# Patient Record
Sex: MALE | URBAN - METROPOLITAN AREA
[De-identification: names, ages, dates, MRNs, and addresses within clinical notes are randomized per-mention and may not be internally consistent; named-entity substitution may affect disease eponyms.]

---

## 2018-12-11 ENCOUNTER — HOSPITAL ENCOUNTER (OUTPATIENT)
Dept: LAB | Age: 38
Discharge: HOME | End: 2018-12-11

## 2018-12-11 PROCEDURE — 88305 TISSUE EXAM BY PATHOLOGIST: CPT

## 2023-06-22 ENCOUNTER — OFFICE VISIT (OUTPATIENT)
Age: 43
End: 2023-06-22

## 2023-06-22 VITALS
HEIGHT: 72 IN | BODY MASS INDEX: 25.73 KG/M2 | OXYGEN SATURATION: 98 % | TEMPERATURE: 97.9 F | DIASTOLIC BLOOD PRESSURE: 70 MMHG | HEART RATE: 64 BPM | RESPIRATION RATE: 16 BRPM | WEIGHT: 190 LBS | SYSTOLIC BLOOD PRESSURE: 130 MMHG

## 2023-06-22 DIAGNOSIS — M79.644 THUMB PAIN, RIGHT: Primary | ICD-10-CM

## 2023-06-22 ASSESSMENT — ENCOUNTER SYMPTOMS
COUGH: 0
NAUSEA: 0
EYE PAIN: 0
ABDOMINAL PAIN: 0
RHINORRHEA: 0
SINUS PRESSURE: 0
EYES NEGATIVE: 1
SHORTNESS OF BREATH: 0

## 2023-06-22 NOTE — PROGRESS NOTES
PROGRESS NOTE    SUBJECTIVE:   Sonu Katz is a 38 y.o. male seen for posterior R thumb pain with extension and decreased ROM. Pt states pain centralized in the proximal joint radiating to the wrist and forearm. Chief Complaint    Wrist Pain         Wrist Pain   The pain is present in the right hand. This is a chronic problem. The current episode started more than 1 month ago. There has been no history of extremity trauma. The problem occurs daily. The problem has been waxing and waning. The pain is at a severity of 6/10. The pain is mild. Associated symptoms include a limited range of motion and stiffness. Pertinent negatives include no fever, inability to bear weight, joint locking or numbness. He has tried NSAIDS for the symptoms. The treatment provided moderate relief. No current outpatient medications on file. No current facility-administered medications for this visit. No Known Allergies    Social History     Tobacco Use    Smoking status: Never    Smokeless tobacco: Never   Substance Use Topics    Alcohol use: Yes    Drug use: Never        Review of Systems   Constitutional:  Negative for fatigue and fever. HENT:  Negative for rhinorrhea and sinus pressure. Eyes: Negative. Negative for pain. Respiratory:  Negative for cough and shortness of breath. Cardiovascular: Negative. Gastrointestinal:  Negative for abdominal pain and nausea. Musculoskeletal:  Positive for stiffness. Neurological:  Negative for dizziness, weakness, light-headedness and numbness. OBJECTIVE:  /70 (Site: Right Upper Arm, Position: Sitting, Cuff Size: Medium Adult)   Pulse 64   Temp 97.9 °F (36.6 °C) (Temporal)   Resp 16   Ht 6' (1.829 m)   Wt 190 lb (86.2 kg)   SpO2 98%   BMI 25.77 kg/m²      No results found for this visit on 06/22/23. Physical Exam  Constitutional:       Appearance: Normal appearance.    HENT:      Right Ear: Tympanic membrane normal.      Left Ear: Tympanic

## 2023-07-10 ENCOUNTER — OFFICE VISIT (OUTPATIENT)
Dept: ORTHOPEDIC SURGERY | Age: 43
End: 2023-07-10

## 2023-07-10 VITALS — BODY MASS INDEX: 25.06 KG/M2 | WEIGHT: 185 LBS | HEIGHT: 72 IN

## 2023-07-10 DIAGNOSIS — M65.4 DE QUERVAIN'S TENOSYNOVITIS, RIGHT: ICD-10-CM

## 2023-07-10 DIAGNOSIS — M79.641 RIGHT HAND PAIN: Primary | ICD-10-CM

## 2023-07-10 RX ORDER — BETAMETHASONE SODIUM PHOSPHATE AND BETAMETHASONE ACETATE 3; 3 MG/ML; MG/ML
6 INJECTION, SUSPENSION INTRA-ARTICULAR; INTRALESIONAL; INTRAMUSCULAR; SOFT TISSUE ONCE
Status: COMPLETED | OUTPATIENT
Start: 2023-07-10 | End: 2023-07-10

## 2023-07-10 RX ADMIN — BETAMETHASONE SODIUM PHOSPHATE AND BETAMETHASONE ACETATE 6 MG: 3; 3 INJECTION, SUSPENSION INTRA-ARTICULAR; INTRALESIONAL; INTRAMUSCULAR; SOFT TISSUE at 10:05

## 2023-07-10 NOTE — PROGRESS NOTES
The patient was prescribed and fitted with a DeQuervain's thumb brace for the right thumb. Patient read and signed documenting they understand and agree to [facility]'s current DME return policy.

## 2023-07-10 NOTE — PROGRESS NOTES
Orthopaedic Hand Surgery Note    Name: Pramod Manzano  YOB: 1980  Gender: male  MRN: [id number]    CC: New patient referred for hand/wrist pain    HPI: Patient is a 38 y.o. male  with a chief complaint of Right radial sided wrist pain. Pain is severe with pinching and gripping activities. He has had this condition in the past, treated with cortisone injection 10 years ago    ROS/Meds/PSH/PMH/FH/SH: I personally reviewed the patients standard intake form. Pertinents are discussed in the HPI    Physical Examination:  Musculoskeletal:   Examination of the Right upper extremity demonstrates normal sensation to light touch in the median, ulnar and radial distribution, cap refill < 5 seconds in all fingers, positive tenderness at the radial styloid with  a positive Finkelstein test. No pain or crepitus at the point of the second and first compartment intersection. No pain at the thumb [facility] joint. Imaging / Electrodiagnostic Tests:     Hand XR: AP, Lateral, Oblique and Thumb CMC joint     Clinical Indication:  1. Right hand pain    2. De Quervain's tenosynovitis, right           Report: AP, lateral, oblique and thumb CMC joint x-ray of the right hand demonstrates no bony abnormalities    Impression: as above     Maria Fernanda Najera MD         Assessment:     ICD-10-CM    1. Right hand pain  M79.641 XR HAND RIGHT (MIN 3 VIEWS)      2. De Quervain's tenosynovitis, right  M65.4 betamethasone acetate-betamethasone sodium phosphate (CELESTONE) injection 6 mg     INJECT TENDON SHEATH/LIGAMENT     [place] [facility] [place]:  We discussed the diagnosis and different treatment options. We again discussed observation, splinting, cortisone injections and surgical release of the first dorsal extensor compartment.   We discussed that de Quervain's tendinitis is a chronic condition regardless of how long the symptoms have been present, this most likely has been progressing for much longer than the

## 2025-01-30 ENCOUNTER — OFFICE VISIT (OUTPATIENT)
Age: 45
End: 2025-01-30

## 2025-01-30 VITALS
RESPIRATION RATE: 19 BRPM | HEIGHT: 72 IN | OXYGEN SATURATION: 98 % | SYSTOLIC BLOOD PRESSURE: 158 MMHG | WEIGHT: 216.4 LBS | TEMPERATURE: 98.5 F | HEART RATE: 103 BPM | DIASTOLIC BLOOD PRESSURE: 78 MMHG | BODY MASS INDEX: 29.31 KG/M2

## 2025-01-30 DIAGNOSIS — J32.9 RHINOSINUSITIS: Primary | ICD-10-CM

## 2025-01-30 DIAGNOSIS — R05.1 ACUTE COUGH: ICD-10-CM

## 2025-01-30 ASSESSMENT — PATIENT HEALTH QUESTIONNAIRE - PHQ9
SUM OF ALL RESPONSES TO PHQ9 QUESTIONS 1 & 2: 0
1. LITTLE INTEREST OR PLEASURE IN DOING THINGS: NOT AT ALL
SUM OF ALL RESPONSES TO PHQ QUESTIONS 1-9: 0
2. FEELING DOWN, DEPRESSED OR HOPELESS: NOT AT ALL
SUM OF ALL RESPONSES TO PHQ QUESTIONS 1-9: 0

## 2025-01-30 ASSESSMENT — ENCOUNTER SYMPTOMS
SINUS PRESSURE: 1
COUGH: 1
EYE PAIN: 0
RHINORRHEA: 1
EYE REDNESS: 0
WHEEZING: 0
DIARRHEA: 0
SORE THROAT: 1
EYE ITCHING: 0
NAUSEA: 0
SINUS PAIN: 1
SHORTNESS OF BREATH: 0
VOMITING: 0
SINUS COMPLAINT: 1

## 2025-01-30 NOTE — PROGRESS NOTES
PROGRESS NOTE    SUBJECTIVE:   Javed Mckenzie is a 44 y.o. male [facility] for frontal sinus pressure, rhinitis, and postnasal drip causing sore throat and nonproductive cough for 9 days. Patient denies some shortness of breath with exertion but no body aches or chills at this time. Patient has history of pneumonia      Chief Complaint    Cough; Shortness of Breath; burning in chest; ear pressure; Fatigue; throat irritation           Sinus Problem  This is a new problem. The current episode started in the past 7 days. The problem has been gradually worsening since onset. There has been no fever. He is experiencing no pain. Associated symptoms include congestion, coughing, ear pain, sinus pressure and a sore throat. Pertinent negatives include no chills, shortness of breath or sneezing.       Current Outpatient Medications   Medication Sig Dispense Refill    amoxicillin-clavulanate (AUGMENTIN) 875-125 MG per tablet Take 1 tablet by mouth 2 times daily for 7 days 14 tablet 0     No current facility-administered medications for this visit.      No Known Allergies    Social History     Tobacco Use    Smoking status: Never    Smokeless tobacco: Never   Vaping Use    Vaping status: Never Used   Substance Use Topics    Alcohol use: Yes    Drug use: Never        Review of Systems   Constitutional:  Negative for chills, fatigue and fever.   HENT:  Positive for congestion, ear pain, postnasal drip, rhinorrhea, sinus pressure, sinus pain and sore throat. Negative for sneezing.    Eyes:  Negative for pain, redness and itching.   Respiratory:  Positive for cough. Negative for shortness of breath and wheezing.    Cardiovascular:  Negative for chest pain.   Gastrointestinal:  Negative for diarrhea, nausea and vomiting.   Musculoskeletal:  Positive for myalgias.   Allergic/Immunologic: Positive for environmental allergies.   Neurological:  Negative for dizziness.

## 2025-03-06 ENCOUNTER — OFFICE VISIT (OUTPATIENT)
Age: 45
End: 2025-03-06

## 2025-03-06 VITALS
TEMPERATURE: 98.1 F | HEART RATE: 93 BPM | RESPIRATION RATE: 19 BRPM | DIASTOLIC BLOOD PRESSURE: 76 MMHG | SYSTOLIC BLOOD PRESSURE: 154 MMHG | HEIGHT: 73 IN | WEIGHT: 215 LBS | OXYGEN SATURATION: 96 % | BODY MASS INDEX: 28.49 KG/M2

## 2025-03-06 DIAGNOSIS — J32.9 RHINOSINUSITIS: Primary | ICD-10-CM

## 2025-03-06 RX ORDER — DOXYCYCLINE HYCLATE 100 MG
100 TABLET ORAL 2 TIMES DAILY
Qty: 14 TABLET | Refills: 0 | Status: SHIPPED | OUTPATIENT
Start: 2025-03-06 | End: 2025-03-13

## 2025-03-06 RX ORDER — BENZONATATE 100 MG/1
100 CAPSULE ORAL 3 TIMES DAILY PRN
Qty: 30 CAPSULE | Refills: 0 | Status: SHIPPED | OUTPATIENT
Start: 2025-03-06 | End: 2025-03-16

## 2025-03-06 ASSESSMENT — ENCOUNTER SYMPTOMS
SORE THROAT: 1
EYE ITCHING: 0
NAUSEA: 0
TROUBLE SWALLOWING: 0
VOMITING: 0
SHORTNESS OF BREATH: 0
EYE REDNESS: 0
SINUS PRESSURE: 1
VOICE CHANGE: 0
SINUS COMPLAINT: 1
SINUS PAIN: 1
RHINORRHEA: 1
DIARRHEA: 0
WHEEZING: 0
COUGH: 1
EYE PAIN: 0

## 2025-03-06 ASSESSMENT — PATIENT HEALTH QUESTIONNAIRE - PHQ9
SUM OF ALL RESPONSES TO PHQ QUESTIONS 1-9: 0
1. LITTLE INTEREST OR PLEASURE IN DOING THINGS: NOT AT ALL
SUM OF ALL RESPONSES TO PHQ QUESTIONS 1-9: 0
2. FEELING DOWN, DEPRESSED OR HOPELESS: NOT AT ALL

## 2025-03-06 NOTE — PROGRESS NOTES
PROGRESS NOTE    SUBJECTIVE:   Javed Mckenzie is a 44 y.o. male seen in the [facility] at [facility] for frontal sinus pressure, rhinitis, and postnasal drip causing sore throat and nonproductive cough for 6 days. Patient denies any shortness of breath with exertion, body aches, or chills at this time.       Chief Complaint    Ear Fullness; Sinus Problem; Cough           Sinus Problem  This is a new problem. The current episode started in the past 7 days. The problem is unchanged. There has been no fever. He is experiencing no pain. Associated symptoms include congestion, coughing, ear pain, sinus pressure and a sore throat. Pertinent negatives include no chills, shortness of breath or sneezing.   Cough  Associated symptoms include ear pain, postnasal drip, rhinorrhea and a sore throat. Pertinent negatives include no chest pain, chills, eye redness, fever, myalgias, shortness of breath or wheezing. His past medical history is significant for environmental allergies.       Current Outpatient Medications   Medication Sig Dispense Refill    doxycycline hyclate (VIBRA-TABS) 100 MG tablet Take 1 tablet by mouth 2 times daily for 7 days 14 tablet 0    benzonatate (TESSALON) 100 MG capsule Take 1 capsule by mouth 3 times daily as needed for Cough 30 capsule 0     No current facility-administered medications for this visit.      No Known Allergies    Social History     Tobacco Use    Smoking status: Never    Smokeless tobacco: Never   Vaping Use    Vaping status: Never Used   Substance Use Topics    Alcohol use: Yes    Drug use: Never        Review of Systems   Constitutional:  Negative for chills, fatigue and fever.   HENT:  Positive for congestion, ear pain, postnasal drip, rhinorrhea, sinus pressure, sinus pain and sore throat. Negative for sneezing, trouble swallowing and voice change.    Eyes:  Negative for pain, redness and itching.   Respiratory:  Positive for cough. Negative for

## 2025-03-13 ENCOUNTER — OFFICE VISIT (OUTPATIENT)
Age: 45
End: 2025-03-13

## 2025-03-13 VITALS
SYSTOLIC BLOOD PRESSURE: 132 MMHG | DIASTOLIC BLOOD PRESSURE: 78 MMHG | OXYGEN SATURATION: 97 % | TEMPERATURE: 98.1 F | RESPIRATION RATE: 16 BRPM | HEART RATE: 94 BPM

## 2025-03-13 DIAGNOSIS — J32.9 RHINOSINUSITIS: ICD-10-CM

## 2025-03-13 DIAGNOSIS — R05.1 ACUTE COUGH: Primary | ICD-10-CM

## 2025-03-13 RX ORDER — BENZONATATE 100 MG/1
100 CAPSULE ORAL 2 TIMES DAILY PRN
Qty: 30 CAPSULE | Refills: 0 | Status: SHIPPED | OUTPATIENT
Start: 2025-03-13 | End: 2025-03-23

## 2025-03-13 ASSESSMENT — ENCOUNTER SYMPTOMS
WHEEZING: 0
SINUS COMPLAINT: 1
VOMITING: 0
EYE REDNESS: 0
DIARRHEA: 0
EYE PAIN: 0
RHINORRHEA: 1
COUGH: 1
SINUS PRESSURE: 1
SORE THROAT: 0
EYE ITCHING: 0
SINUS PAIN: 1
SHORTNESS OF BREATH: 0
NAUSEA: 0

## 2025-03-13 NOTE — PROGRESS NOTES
submental, tonsillar, preauricular, posterior auricular or occipital adenopathy.      Left side of head: Submandibular adenopathy present. No submental, tonsillar, preauricular, posterior auricular or occipital adenopathy.   Neurological:      Mental Status: He is alert and oriented to person, place, and time.   Psychiatric:         Behavior: Behavior is cooperative.         ASSESSMENT and PLAN    Diagnoses and all orders for this visit:    Acute cough  -     amoxicillin-clavulanate (AUGMENTIN) 875-125 MG per tablet; Take 1 tablet by mouth 2 times daily for 7 days  -     benzonatate (TESSALON) 100 MG capsule; Take 1 capsule by mouth 2 times daily as needed for Cough    Rhinosinusitis  -     amoxicillin-clavulanate (AUGMENTIN) 875-125 MG per tablet; Take 1 tablet by mouth 2 times daily for 7 days  -     benzonatate (TESSALON) 100 MG capsule; Take 1 capsule by mouth 2 times daily as needed for Cough        Counseled on benefits of having a primary care provider which includes, but is not limited to, continuity of care and having a medical home when concerns arise. Also enforced that onsite clinic policy states that we are not to take the place of a primary care provider, pt verbalized understanding.     SEs and risk vs benefits associated with medications prescribed discussed with patient who verbalized understanding. Pt verbalized understanding and agreement with plan of care. RTC for persisting/worsening symptoms or new complaints that arise. Discussed signs and symptoms that would warrant immediate evaluation including, but not limited to HA, blurred vision, speech disturbance, difficulty with ambulation/gait, numbness, tingling, weakness, syncope, chest pain, or shortness of breath.    I have reviewed the patient's medication list, past medical, family, social, and surgical history in detail and updated the patient record appropriately.    BEENA Albright - NP

## 2025-03-19 ENCOUNTER — TELEPHONE (OUTPATIENT)
Age: 45
End: 2025-03-19

## 2025-03-19 NOTE — TELEPHONE ENCOUNTER
Call placed to evaluate symptoms reported on 3/13/25. Antibiotic therapy continues as ordered and is tolerated well. Patient admits to feeling much better . Patient with no further concerns at this time.